# Patient Record
Sex: MALE | Race: WHITE | NOT HISPANIC OR LATINO
[De-identification: names, ages, dates, MRNs, and addresses within clinical notes are randomized per-mention and may not be internally consistent; named-entity substitution may affect disease eponyms.]

---

## 2017-08-14 ENCOUNTER — CHARTING TRANS (OUTPATIENT)
Dept: OTHER | Age: 32
End: 2017-08-14

## 2018-11-03 VITALS
HEIGHT: 73 IN | HEART RATE: 68 BPM | TEMPERATURE: 98.2 F | SYSTOLIC BLOOD PRESSURE: 110 MMHG | BODY MASS INDEX: 26.51 KG/M2 | RESPIRATION RATE: 18 BRPM | WEIGHT: 200 LBS | DIASTOLIC BLOOD PRESSURE: 70 MMHG

## 2020-01-28 ENCOUNTER — WALK IN (OUTPATIENT)
Dept: URGENT CARE | Age: 35
End: 2020-01-28

## 2020-01-28 DIAGNOSIS — Z00.00 NORMAL PHYSICAL EXAM: Primary | ICD-10-CM

## 2020-01-28 PROCEDURE — 69209 REMOVE IMPACTED EAR WAX UNI: CPT | Performed by: NURSE PRACTITIONER

## 2020-01-28 PROCEDURE — 99213 OFFICE O/P EST LOW 20 MIN: CPT | Performed by: NURSE PRACTITIONER

## 2020-01-28 ASSESSMENT — ENCOUNTER SYMPTOMS
EYES NEGATIVE: 1
RESPIRATORY NEGATIVE: 1
CONSTITUTIONAL NEGATIVE: 1

## 2020-01-29 ENCOUNTER — TELEPHONE (OUTPATIENT)
Dept: SCHEDULING | Age: 35
End: 2020-01-29

## 2021-05-17 ENCOUNTER — TELEPHONE (OUTPATIENT)
Dept: FAMILY MEDICINE CLINIC | Facility: CLINIC | Age: 36
End: 2021-05-17

## 2021-05-18 ENCOUNTER — TELEPHONE (OUTPATIENT)
Dept: FAMILY MEDICINE CLINIC | Facility: CLINIC | Age: 36
End: 2021-05-18

## 2021-05-18 NOTE — TELEPHONE ENCOUNTER
LOV 3/15/14  Pt c/o RUQ abdominal discomfort x 2 weeks. Pt thinks it may be anxiety related but would like it check out. No diarrhea, no vomiting, no fevers, no cold symptoms  Offered an appt for tomorrow- Pt request Thurs.   appt given with Nick Jacobo

## 2021-05-18 NOTE — TELEPHONE ENCOUNTER
Pt sent in Friddie Katie message stating he is having severe abdominal pain. Called pt LM to assist pt with scheduling.

## 2021-06-10 ENCOUNTER — OFFICE VISIT (OUTPATIENT)
Dept: FAMILY MEDICINE CLINIC | Facility: CLINIC | Age: 36
End: 2021-06-10
Payer: COMMERCIAL

## 2021-06-10 VITALS
SYSTOLIC BLOOD PRESSURE: 108 MMHG | BODY MASS INDEX: 26.01 KG/M2 | WEIGHT: 198.38 LBS | HEART RATE: 84 BPM | RESPIRATION RATE: 16 BRPM | HEIGHT: 73.23 IN | TEMPERATURE: 98 F | DIASTOLIC BLOOD PRESSURE: 60 MMHG

## 2021-06-10 DIAGNOSIS — F41.0 PANIC ATTACK: ICD-10-CM

## 2021-06-10 DIAGNOSIS — Z00.00 ANNUAL PHYSICAL EXAM: Primary | ICD-10-CM

## 2021-06-10 PROCEDURE — 90471 IMMUNIZATION ADMIN: CPT | Performed by: FAMILY MEDICINE

## 2021-06-10 PROCEDURE — 3008F BODY MASS INDEX DOCD: CPT | Performed by: FAMILY MEDICINE

## 2021-06-10 PROCEDURE — 90715 TDAP VACCINE 7 YRS/> IM: CPT | Performed by: FAMILY MEDICINE

## 2021-06-10 PROCEDURE — 99385 PREV VISIT NEW AGE 18-39: CPT | Performed by: FAMILY MEDICINE

## 2021-06-10 PROCEDURE — 3074F SYST BP LT 130 MM HG: CPT | Performed by: FAMILY MEDICINE

## 2021-06-10 PROCEDURE — 3078F DIAST BP <80 MM HG: CPT | Performed by: FAMILY MEDICINE

## 2021-06-10 RX ORDER — LORAZEPAM 0.5 MG/1
1 TABLET ORAL NIGHTLY
COMMUNITY
Start: 2021-05-09 | End: 2021-07-27

## 2021-06-10 NOTE — PROGRESS NOTES
Fiona Daniel is a 38year old male who presents for a complete physical exam.     had concerns including Physical (Annual), Sleep Problem (9 day stretch , ), and Anxiety (had anxiety attack , went to urgent care and was given alprazolam ).    His last a Endocrine: Negative. Negative for polydipsia, polyphagia and polyuria. Genitourinary: Negative. Musculoskeletal: Negative. Negative for neck pain. Skin: Negative. Neurological: Negative. Psychiatric/Behavioral: Negative.     All other syste Occupational Exposure: No        Hobby Hazards: No        Sleep Concern: No        Stress Concern: Yes        Weight Concern: No        Special Diet: No        Exercise: Yes          walk 90-2 hours daily         Seat Belt: Yes     Exercise: three times pe Cervical back: Normal range of motion and neck supple. Right lower leg: No edema. Left lower leg: No edema. Skin:     General: Skin is warm and dry. Capillary Refill: Capillary refill takes less than 2 seconds.    Neurological:      General

## 2021-06-10 NOTE — PATIENT INSTRUCTIONS
Treating Insomnia     Learning to relax before bedtime can improve your sleep. Good sleeping habits are a key part of treatment.  If needed, some medicines may help you sleep better at first. Making healthy lifestyle changes and learning to relax can im and body tension may keep you awake at night. To unwind before bedtime, try a warm bath, meditation, or yoga. Also try the following:  · Deep breathing. Sit or lie back in a chair. Take a slow, deep breath. Hold it for 5 counts.  Then breathe out slowly thr

## 2021-07-01 LAB
ABSOLUTE BASOPHILS: 38 CELLS/UL (ref 0–200)
ABSOLUTE EOSINOPHILS: 113 CELLS/UL (ref 15–500)
ABSOLUTE LYMPHOCYTES: 1553 CELLS/UL (ref 850–3900)
ABSOLUTE MONOCYTES: 518 CELLS/UL (ref 200–950)
ABSOLUTE NEUTROPHILS: 5280 CELLS/UL (ref 1500–7800)
ALBUMIN/GLOBULIN RATIO: 1.7 (CALC) (ref 1–2.5)
ALBUMIN: 4.6 G/DL (ref 3.6–5.1)
ALKALINE PHOSPHATASE: 61 U/L (ref 36–130)
ALT: 16 U/L (ref 9–46)
AST: 19 U/L (ref 10–40)
BASOPHILS: 0.5 %
BILIRUBIN, TOTAL: 1.2 MG/DL (ref 0.2–1.2)
BUN: 18 MG/DL (ref 7–25)
CALCIUM: 9.8 MG/DL (ref 8.6–10.3)
CARBON DIOXIDE: 26 MMOL/L (ref 20–32)
CHLORIDE: 104 MMOL/L (ref 98–110)
CHOL/HDLC RATIO: 4.2 (CALC)
CHOLESTEROL, TOTAL: 220 MG/DL
CREATININE: 1.08 MG/DL (ref 0.6–1.35)
EGFR IF AFRICN AM: 101 ML/MIN/1.73M2
EGFR IF NONAFRICN AM: 87 ML/MIN/1.73M2
EOSINOPHILS: 1.5 %
GLOBULIN: 2.7 G/DL (CALC) (ref 1.9–3.7)
GLUCOSE: 91 MG/DL (ref 65–99)
HDL CHOLESTEROL: 52 MG/DL
HEMATOCRIT: 42.3 % (ref 38.5–50)
HEMOGLOBIN: 14.2 G/DL (ref 13.2–17.1)
LDL-CHOLESTEROL: 129 MG/DL (CALC)
LYMPHOCYTES: 20.7 %
MCH: 31.1 PG (ref 27–33)
MCHC: 33.6 G/DL (ref 32–36)
MCV: 92.8 FL (ref 80–100)
MONOCYTES: 6.9 %
MPV: 11.5 FL (ref 7.5–12.5)
NEUTROPHILS: 70.4 %
NON-HDL CHOLESTEROL: 168 MG/DL (CALC)
PLATELET COUNT: 223 THOUSAND/UL (ref 140–400)
POTASSIUM: 4.2 MMOL/L (ref 3.5–5.3)
PROTEIN, TOTAL: 7.3 G/DL (ref 6.1–8.1)
RDW: 12.1 % (ref 11–15)
RED BLOOD CELL COUNT: 4.56 MILLION/UL (ref 4.2–5.8)
SODIUM: 140 MMOL/L (ref 135–146)
TRIGLYCERIDES: 255 MG/DL
TSH W/REFLEX TO FT4: 2.23 MIU/L (ref 0.4–4.5)
WHITE BLOOD CELL COUNT: 7.5 THOUSAND/UL (ref 3.8–10.8)

## 2021-07-27 RX ORDER — LORAZEPAM 0.5 MG/1
0.5 TABLET ORAL NIGHTLY
Qty: 30 TABLET | Refills: 2 | Status: SHIPPED | OUTPATIENT
Start: 2021-07-27

## 2021-07-27 NOTE — TELEPHONE ENCOUNTER
Pt calling to request refill on Lorazepam. States he takes it roughly once week when he is not able to sleep. [facility]

## 2021-07-27 NOTE — TELEPHONE ENCOUNTER
LOV 6/10/2021    Requesting refill of Lorazepam. It does not appear we have ever filled this for pt before.     Routed to Dr. Vernon Crowe

## 2021-07-27 NOTE — TELEPHONE ENCOUNTER
Please notify:    Requested Prescriptions     Signed Prescriptions Disp Refills   • LORazepam 0.5 MG Oral Tab 30 tablet 2     Sig: Take 1 tablet (0.5 mg total) by mouth nightly.  As needed     Authorizing Provider: Ranjana Maldonado to:      [facility]

## 2021-09-27 ENCOUNTER — HOSPITAL ENCOUNTER (OUTPATIENT)
Age: 36
Discharge: HOME | End: 2021-09-27
Payer: COMMERCIAL

## 2021-09-27 VITALS
HEART RATE: 102 BPM | RESPIRATION RATE: 18 BRPM | WEIGHT: 206 LBS | OXYGEN SATURATION: 100 % | HEIGHT: 73 IN | BODY MASS INDEX: 27.3 KG/M2 | SYSTOLIC BLOOD PRESSURE: 121 MMHG | TEMPERATURE: 98 F | DIASTOLIC BLOOD PRESSURE: 87 MMHG

## 2021-09-27 DIAGNOSIS — G44.209 TENSION HEADACHE: Primary | ICD-10-CM

## 2021-09-27 PROCEDURE — 99203 OFFICE O/P NEW LOW 30 MIN: CPT | Performed by: NURSE PRACTITIONER

## 2021-09-27 RX ORDER — CYCLOBENZAPRINE HCL 10 MG
10 TABLET ORAL 3 TIMES DAILY PRN
Qty: 15 TABLET | Refills: 0 | Status: SHIPPED | OUTPATIENT
Start: 2021-09-27 | End: 2021-10-04

## 2021-09-27 RX ORDER — BUTALBITAL, ACETAMINOPHEN AND CAFFEINE 50; 325; 40 MG/1; MG/1; MG/1
1-2 TABLET ORAL EVERY 6 HOURS PRN
Qty: 10 TABLET | Refills: 0 | Status: SHIPPED | OUTPATIENT
Start: 2021-09-27 | End: 2021-10-04

## 2021-09-27 NOTE — ED INITIAL ASSESSMENT (HPI)
Per pt having intermittent dull tension headache for a week, [facility] and no changes in symptoms.

## 2021-09-27 NOTE — ED PROVIDER NOTES
Patient Seen in: [facility]      History   Patient presents with:  Headache: Tension headache for over a week - Entered by patient    Stated Complaint: Headache - Tension headache for over a week    Subjective:   Patient presents to the imme pallor, rash and wound. Neurological: Positive for headaches. Negative for dizziness, syncope, facial asymmetry, speech difficulty, weakness, light-headedness and numbness.        Positive for stated complaint: Headache - Tension headache for over a week Cervical back: Normal range of motion and neck supple. Skin:     General: Skin is warm and dry. Capillary Refill: Capillary refill takes less than 2 seconds. Findings: No rash. Neurological:      General: No focal deficit present.       Menta take ibuprofen. Strict ED return precautions given. Discussed plan of care and agrees. Patient directed to follow-up with primary care physician, call for an appointment. Patient understood instructions. Patient feels safe to go home.

## 2021-09-29 ENCOUNTER — OFFICE VISIT (OUTPATIENT)
Dept: FAMILY MEDICINE CLINIC | Facility: CLINIC | Age: 36
End: 2021-09-29
Payer: COMMERCIAL

## 2021-09-29 VITALS
HEIGHT: 73 IN | DIASTOLIC BLOOD PRESSURE: 80 MMHG | WEIGHT: 202.19 LBS | SYSTOLIC BLOOD PRESSURE: 106 MMHG | RESPIRATION RATE: 16 BRPM | BODY MASS INDEX: 26.8 KG/M2 | HEART RATE: 76 BPM

## 2021-09-29 DIAGNOSIS — G44.209 MUSCLE TENSION HEADACHE: Primary | ICD-10-CM

## 2021-09-29 PROCEDURE — 99213 OFFICE O/P EST LOW 20 MIN: CPT | Performed by: FAMILY MEDICINE

## 2021-09-29 PROCEDURE — 3008F BODY MASS INDEX DOCD: CPT | Performed by: FAMILY MEDICINE

## 2021-09-29 PROCEDURE — 3074F SYST BP LT 130 MM HG: CPT | Performed by: FAMILY MEDICINE

## 2021-09-29 PROCEDURE — 3079F DIAST BP 80-89 MM HG: CPT | Performed by: FAMILY MEDICINE

## 2021-09-29 RX ORDER — MELOXICAM 15 MG/1
15 TABLET ORAL DAILY
Qty: 30 TABLET | Refills: 2 | Status: SHIPPED | OUTPATIENT
Start: 2021-09-29 | End: 2021-12-28

## 2021-09-29 NOTE — PATIENT INSTRUCTIONS
meloxicam in place of ibuprofen, daily for 7-14 days,   Cyclobenzaprine muscle relaxant up to 3x daily as needed, cautiously with ativan because of sedation      Treating Insomnia     Learning to relax before bedtime can improve your sleep.    Good sleeping noises, bright lights, TVs, cell phones, and computers from your sleeping environment. · Use a comfortable mattress and pillow. Learn to relax  Stress, anxiety, and body tension may keep you awake at night.  To unwind before bedtime, try a warm bath, medi

## 2021-09-29 NOTE — PROGRESS NOTES
Leobardo Rios is a 43year old male coming in for had concerns including Headache (started the within the last days now, was prescribed medication hasn't taken them yet wanted to talk about it first before taking it ).     Subjective:   Headache   This is a new p normal. No respiratory distress. Breath sounds: No stridor. Musculoskeletal:         General: Normal range of motion. Cervical back: Normal range of motion and neck supple. Skin:     Findings: No rash.    Neurological:      Mental Status: He i

## 2021-12-13 ENCOUNTER — OFFICE VISIT (OUTPATIENT)
Dept: FAMILY MEDICINE CLINIC | Facility: CLINIC | Age: 36
End: 2021-12-13
Payer: COMMERCIAL

## 2021-12-13 VITALS
BODY MASS INDEX: 27.75 KG/M2 | SYSTOLIC BLOOD PRESSURE: 110 MMHG | WEIGHT: 209.38 LBS | HEIGHT: 73 IN | RESPIRATION RATE: 18 BRPM | HEART RATE: 78 BPM | DIASTOLIC BLOOD PRESSURE: 70 MMHG

## 2021-12-13 DIAGNOSIS — R53.83 FATIGUE, UNSPECIFIED TYPE: Primary | ICD-10-CM

## 2021-12-13 PROCEDURE — 3008F BODY MASS INDEX DOCD: CPT | Performed by: FAMILY MEDICINE

## 2021-12-13 PROCEDURE — 3078F DIAST BP <80 MM HG: CPT | Performed by: FAMILY MEDICINE

## 2021-12-13 PROCEDURE — 3074F SYST BP LT 130 MM HG: CPT | Performed by: FAMILY MEDICINE

## 2021-12-13 PROCEDURE — 99213 OFFICE O/P EST LOW 20 MIN: CPT | Performed by: FAMILY MEDICINE

## 2021-12-13 NOTE — PROGRESS NOTES
Ed Greene is a 43year old male coming in for had concerns including Fatigue (started 10-11 days go, crashing around the day / mid-day, feeling tired and then started to get headaches, feeling light headed ).     Subjective:   HPI 2 weeks of issues with fatig Mental Status: He is alert and oriented to person, place, and time. Psychiatric:         Mood and Affect: Mood normal.         Behavior: Behavior normal.         Thought Content:  Thought content normal.         Judgment: Judgment normal.           Asses

## 2022-02-10 RX ORDER — LORAZEPAM 0.5 MG/1
0.5 TABLET ORAL NIGHTLY
Qty: 30 TABLET | Refills: 2 | Status: SHIPPED | OUTPATIENT
Start: 2022-02-10

## 2022-03-23 RX ORDER — MELOXICAM 15 MG/1
TABLET ORAL
Qty: 30 TABLET | Refills: 2 | Status: SHIPPED | OUTPATIENT
Start: 2022-03-23

## 2022-05-20 ENCOUNTER — TELEPHONE (OUTPATIENT)
Dept: FAMILY MEDICINE CLINIC | Facility: CLINIC | Age: 37
End: 2022-05-20

## 2022-05-20 DIAGNOSIS — Z00.00 LABORATORY EXAMINATION ORDERED AS PART OF A ROUTINE GENERAL MEDICAL EXAMINATION: ICD-10-CM

## 2022-05-20 DIAGNOSIS — E55.9 VITAMIN D DEFICIENCY: Primary | ICD-10-CM

## 2022-05-20 NOTE — TELEPHONE ENCOUNTER
Please enter lab orders for the patient's upcoming physical appointment. Physical scheduled: Your appointments     Date & Time Appointment Department [facility])    Jun 17, 2022 11:30 AM CDT Adult Physical with Wilfred Simpson MD [address] [facility]  ([address])    PLEASE NOTE - Most insurances allow a Complete Physical once every 366 days. Please schedule accordingly. Please arrive 15 minutes prior to your scheduled appointment. Please also bring your Insurance card, Photo ID, and your medication bottles or a list of your current medication. If you no longer require this appointment, please contact your physician office to cancel. [address] [place] [address] [facility] [phone]         Preferred lab: [facility]     The patient has been notified to complete fasting labs prior to their physical appointment.

## 2022-05-22 NOTE — TELEPHONE ENCOUNTER
1. Vitamin D deficiency (Primary)  -     Vitamin D, 25-Hydroxy  2. Laboratory examination ordered as part of a routine general medical examination  -     Lipid Panel  -     Comp Metabolic Panel (14)  -     CBC, Platelet; No Differential  -     TSH W Reflex To Free T4  -     Vitamin D, 25-Hydroxy       OK to notify.  Thanks, Dominik Youssef MD

## 2022-05-23 PROBLEM — F41.0 ANXIETY ATTACK: Status: ACTIVE | Noted: 2022-05-23

## 2022-06-17 ENCOUNTER — OFFICE VISIT (OUTPATIENT)
Dept: FAMILY MEDICINE CLINIC | Facility: CLINIC | Age: 37
End: 2022-06-17
Payer: COMMERCIAL

## 2022-06-17 VITALS
BODY MASS INDEX: 28.01 KG/M2 | SYSTOLIC BLOOD PRESSURE: 108 MMHG | RESPIRATION RATE: 18 BRPM | DIASTOLIC BLOOD PRESSURE: 70 MMHG | WEIGHT: 211.38 LBS | HEART RATE: 78 BPM | HEIGHT: 73 IN

## 2022-06-17 DIAGNOSIS — Z00.00 ANNUAL PHYSICAL EXAM: Primary | ICD-10-CM

## 2022-06-17 DIAGNOSIS — F51.01 PRIMARY INSOMNIA: ICD-10-CM

## 2022-06-17 PROCEDURE — 3074F SYST BP LT 130 MM HG: CPT | Performed by: FAMILY MEDICINE

## 2022-06-17 PROCEDURE — 3008F BODY MASS INDEX DOCD: CPT | Performed by: FAMILY MEDICINE

## 2022-06-17 PROCEDURE — 99395 PREV VISIT EST AGE 18-39: CPT | Performed by: FAMILY MEDICINE

## 2022-06-17 PROCEDURE — 3078F DIAST BP <80 MM HG: CPT | Performed by: FAMILY MEDICINE

## 2022-06-17 RX ORDER — TRAZODONE HYDROCHLORIDE 50 MG/1
50 TABLET ORAL NIGHTLY PRN
Qty: 90 TABLET | Refills: 1 | Status: SHIPPED | OUTPATIENT
Start: 2022-06-17 | End: 2022-12-14

## 2022-06-18 NOTE — ASSESSMENT & PLAN NOTE
Trazodone 50 prn. Risks and benefits of medication discussed and alternatives discussed. Patient wishes to proceed with treatment.

## 2022-07-28 ENCOUNTER — TELEPHONE (OUTPATIENT)
Dept: FAMILY MEDICINE CLINIC | Facility: CLINIC | Age: 37
End: 2022-07-28

## 2022-07-28 NOTE — TELEPHONE ENCOUNTER
Pt scheduled an appt in My Chart for chronic fatigue and lightheadedness. He is scheduled on 8/8/22 with Dr. Precious Dial is it ok to wait that long?

## 2022-08-05 LAB
ALBUMIN/GLOBULIN RATIO: 1.8 (CALC) (ref 1–2.5)
ALBUMIN: 4.6 G/DL (ref 3.6–5.1)
ALKALINE PHOSPHATASE: 57 U/L (ref 36–130)
ALT: 24 U/L (ref 9–46)
AST: 18 U/L (ref 10–40)
BILIRUBIN, TOTAL: 1 MG/DL (ref 0.2–1.2)
BUN: 13 MG/DL (ref 7–25)
CALCIUM: 9.4 MG/DL (ref 8.6–10.3)
CARBON DIOXIDE: 29 MMOL/L (ref 20–32)
CHLORIDE: 104 MMOL/L (ref 98–110)
CHOL/HDLC RATIO: 4.6 (CALC)
CHOLESTEROL, TOTAL: 207 MG/DL
CREATININE: 1 MG/DL (ref 0.6–1.26)
EGFR: 99 ML/MIN/1.73M2
GLOBULIN: 2.5 G/DL (CALC) (ref 1.9–3.7)
GLUCOSE: 94 MG/DL (ref 65–99)
HDL CHOLESTEROL: 45 MG/DL
HEMATOCRIT: 40.4 % (ref 38.5–50)
HEMOGLOBIN: 13.8 G/DL (ref 13.2–17.1)
LDL-CHOLESTEROL: 118 MG/DL (CALC)
MCH: 30.9 PG (ref 27–33)
MCHC: 34.2 G/DL (ref 32–36)
MCV: 90.6 FL (ref 80–100)
MPV: 11 FL (ref 7.5–12.5)
NON-HDL CHOLESTEROL: 162 MG/DL (CALC)
PLATELET COUNT: 220 THOUSAND/UL (ref 140–400)
POTASSIUM: 4.4 MMOL/L (ref 3.5–5.3)
PROTEIN, TOTAL: 7.1 G/DL (ref 6.1–8.1)
RDW: 12 % (ref 11–15)
RED BLOOD CELL COUNT: 4.46 MILLION/UL (ref 4.2–5.8)
SODIUM: 138 MMOL/L (ref 135–146)
TRIGLYCERIDES: 311 MG/DL
TSH W/REFLEX TO FT4: 2.01 MIU/L (ref 0.4–4.5)
VITAMIN D, 25-OH, TOTAL: 35 NG/ML (ref 30–100)
WHITE BLOOD CELL COUNT: 6.6 THOUSAND/UL (ref 3.8–10.8)

## 2022-08-08 ENCOUNTER — OFFICE VISIT (OUTPATIENT)
Dept: FAMILY MEDICINE CLINIC | Facility: CLINIC | Age: 37
End: 2022-08-08
Payer: COMMERCIAL

## 2022-08-08 VITALS
HEIGHT: 73.5 IN | RESPIRATION RATE: 18 BRPM | SYSTOLIC BLOOD PRESSURE: 118 MMHG | DIASTOLIC BLOOD PRESSURE: 72 MMHG | HEART RATE: 72 BPM | WEIGHT: 215.5 LBS | BODY MASS INDEX: 27.95 KG/M2

## 2022-08-08 DIAGNOSIS — F41.0 ANXIETY ATTACK: ICD-10-CM

## 2022-08-08 DIAGNOSIS — E55.9 VITAMIN D DEFICIENCY: ICD-10-CM

## 2022-08-08 DIAGNOSIS — R53.83 OTHER FATIGUE: Primary | ICD-10-CM

## 2022-08-08 DIAGNOSIS — F51.01 PRIMARY INSOMNIA: ICD-10-CM

## 2022-08-08 PROCEDURE — 3074F SYST BP LT 130 MM HG: CPT | Performed by: FAMILY MEDICINE

## 2022-08-08 PROCEDURE — 99213 OFFICE O/P EST LOW 20 MIN: CPT | Performed by: FAMILY MEDICINE

## 2022-08-08 PROCEDURE — 3078F DIAST BP <80 MM HG: CPT | Performed by: FAMILY MEDICINE

## 2022-08-08 PROCEDURE — 3008F BODY MASS INDEX DOCD: CPT | Performed by: FAMILY MEDICINE

## 2022-08-08 RX ORDER — LORAZEPAM 0.5 MG/1
0.5 TABLET ORAL NIGHTLY
Qty: 30 TABLET | Refills: 3 | Status: SHIPPED | OUTPATIENT
Start: 2022-08-08

## 2022-08-15 ENCOUNTER — PATIENT MESSAGE (OUTPATIENT)
Dept: FAMILY MEDICINE CLINIC | Facility: CLINIC | Age: 37
End: 2022-08-15

## 2022-08-15 NOTE — TELEPHONE ENCOUNTER
From: Kameron Giordano  To: Darcy Machado MD  Sent: 8/15/2022 8:42 AM CDT  Subject: Sleep Study    Dr. Mau Harrison you had a nice weekend. Just checking back to see if you've heard anything back regarding a potential sleep study. Please let me know when you have a chance.     Thanks for your help,  Khloe Zaman

## 2022-08-29 ENCOUNTER — PATIENT MESSAGE (OUTPATIENT)
Dept: FAMILY MEDICINE CLINIC | Facility: CLINIC | Age: 37
End: 2022-08-29

## 2022-08-30 NOTE — TELEPHONE ENCOUNTER
From: Ervin Porras  To: Marylen Channel, MD  Sent: 8/29/2022 5:03 PM CDT  Subject: Sleep Study    Dr. Valentino Mayotte,    My insurance rejected the in person sleep study. They approved the at home sleep study kit. I have two questions:    1) What would it cost out of pocket to do the in person sleep study? 2) How do I go about scheduling a time to  the equipment for the at home sleep study?     Thanks for your help,  Janell Silver

## 2022-09-09 ENCOUNTER — OFFICE VISIT (OUTPATIENT)
Dept: SLEEP CENTER | Age: 37
End: 2022-09-09
Attending: INTERNAL MEDICINE
Payer: COMMERCIAL

## 2022-09-09 PROCEDURE — 95806 SLEEP STUDY UNATT&RESP EFFT: CPT

## 2022-11-07 ENCOUNTER — OFFICE VISIT (OUTPATIENT)
Dept: FAMILY MEDICINE CLINIC | Facility: CLINIC | Age: 37
End: 2022-11-07
Payer: COMMERCIAL

## 2022-11-07 VITALS
WEIGHT: 219 LBS | DIASTOLIC BLOOD PRESSURE: 78 MMHG | HEART RATE: 78 BPM | SYSTOLIC BLOOD PRESSURE: 136 MMHG | HEIGHT: 73.5 IN | BODY MASS INDEX: 28.41 KG/M2 | RESPIRATION RATE: 16 BRPM

## 2022-11-07 DIAGNOSIS — R53.83 OTHER FATIGUE: Primary | ICD-10-CM

## 2022-11-07 DIAGNOSIS — G44.229 CHRONIC TENSION-TYPE HEADACHE, NOT INTRACTABLE: ICD-10-CM

## 2022-11-07 DIAGNOSIS — F51.01 PRIMARY INSOMNIA: ICD-10-CM

## 2022-11-07 PROCEDURE — 99214 OFFICE O/P EST MOD 30 MIN: CPT | Performed by: FAMILY MEDICINE

## 2022-11-07 PROCEDURE — 3078F DIAST BP <80 MM HG: CPT | Performed by: FAMILY MEDICINE

## 2022-11-07 PROCEDURE — 3008F BODY MASS INDEX DOCD: CPT | Performed by: FAMILY MEDICINE

## 2022-11-07 PROCEDURE — 3075F SYST BP GE 130 - 139MM HG: CPT | Performed by: FAMILY MEDICINE

## 2022-11-15 ENCOUNTER — HOSPITAL ENCOUNTER (OUTPATIENT)
Dept: CV DIAGNOSTICS | Facility: HOSPITAL | Age: 37
Discharge: HOME | End: 2022-11-15
Attending: FAMILY MEDICINE
Payer: COMMERCIAL

## 2022-11-15 DIAGNOSIS — R53.83 OTHER FATIGUE: ICD-10-CM

## 2022-11-15 PROCEDURE — 93017 CV STRESS TEST TRACING ONLY: CPT | Performed by: FAMILY MEDICINE

## 2022-11-15 PROCEDURE — 93018 CV STRESS TEST I&R ONLY: CPT | Performed by: FAMILY MEDICINE

## 2023-02-25 ENCOUNTER — TELEPHONE (OUTPATIENT)
Dept: URGENT CARE | Age: 38
End: 2023-02-25

## 2023-02-27 ENCOUNTER — OFFICE VISIT (OUTPATIENT)
Dept: FAMILY MEDICINE CLINIC | Facility: CLINIC | Age: 38
End: 2023-02-27
Payer: COMMERCIAL

## 2023-02-27 VITALS
HEIGHT: 73.5 IN | WEIGHT: 227.81 LBS | HEART RATE: 80 BPM | SYSTOLIC BLOOD PRESSURE: 120 MMHG | RESPIRATION RATE: 16 BRPM | DIASTOLIC BLOOD PRESSURE: 80 MMHG | BODY MASS INDEX: 29.55 KG/M2

## 2023-02-27 DIAGNOSIS — Z00.00 LABORATORY EXAMINATION ORDERED AS PART OF A ROUTINE GENERAL MEDICAL EXAMINATION: ICD-10-CM

## 2023-02-27 DIAGNOSIS — R10.11 RUQ ABDOMINAL PAIN: Primary | ICD-10-CM

## 2023-02-27 DIAGNOSIS — F41.0 ANXIETY ATTACK: ICD-10-CM

## 2023-02-27 RX ORDER — LORAZEPAM 0.5 MG/1
0.5 TABLET ORAL NIGHTLY PRN
Qty: 30 TABLET | Refills: 1 | Status: SHIPPED | OUTPATIENT
Start: 2023-02-27

## 2023-05-19 ENCOUNTER — TELEPHONE (OUTPATIENT)
Dept: FAMILY MEDICINE CLINIC | Facility: CLINIC | Age: 38
End: 2023-05-19

## 2023-05-19 NOTE — TELEPHONE ENCOUNTER
Please enter lab orders for the patient's upcoming physical appointment. Physical scheduled: Your appointments     Date & Time Appointment Department [facility])    Jun 21, 2023  1:30 PM CDT Adult Physical with Sterling Hernandez MD [address] [place] [address] ([address])    PLEASE NOTE - Most insurances allow a Complete Physical once every 366 days. Please schedule accordingly. Please arrive 15 minutes prior to your scheduled appointment. Please also bring your Insurance card, Photo ID, and your medication bottles or a list of your current medication. If you no longer require this appointment, please contact your physician office to cancel. Lionel Wen [address] [facility] [phone]         Preferred lab: [facility]     The patient has been notified to complete fasting labs prior to their physical appointment.

## 2023-06-16 LAB
ALBUMIN/GLOBULIN RATIO: 1.7 (CALC) (ref 1–2.5)
ALBUMIN: 4.6 G/DL (ref 3.6–5.1)
ALKALINE PHOSPHATASE: 58 U/L (ref 36–130)
ALT: 22 U/L (ref 9–46)
AST: 18 U/L (ref 10–40)
BILIRUBIN, TOTAL: 0.9 MG/DL (ref 0.2–1.2)
BUN: 13 MG/DL (ref 7–25)
CALCIUM: 9.3 MG/DL (ref 8.6–10.3)
CARBON DIOXIDE: 25 MMOL/L (ref 20–32)
CHLORIDE: 101 MMOL/L (ref 98–110)
CHOL/HDLC RATIO: 4.6 (CALC)
CHOLESTEROL, TOTAL: 221 MG/DL
CREATININE: 0.96 MG/DL (ref 0.6–1.26)
EGFR: 103 ML/MIN/1.73M2
GLOBULIN: 2.7 G/DL (CALC) (ref 1.9–3.7)
GLUCOSE: 89 MG/DL (ref 65–99)
HDL CHOLESTEROL: 48 MG/DL
HEMATOCRIT: 42.1 % (ref 38.5–50)
HEMOGLOBIN: 14.4 G/DL (ref 13.2–17.1)
MCH: 31.1 PG (ref 27–33)
MCHC: 34.2 G/DL (ref 32–36)
MCV: 90.9 FL (ref 80–100)
MPV: 11.3 FL (ref 7.5–12.5)
NON-HDL CHOLESTEROL: 173 MG/DL (CALC)
PLATELET COUNT: 232 THOUSAND/UL (ref 140–400)
POTASSIUM: 4.3 MMOL/L (ref 3.5–5.3)
PROTEIN, TOTAL: 7.3 G/DL (ref 6.1–8.1)
RDW: 12.1 % (ref 11–15)
RED BLOOD CELL COUNT: 4.63 MILLION/UL (ref 4.2–5.8)
SODIUM: 137 MMOL/L (ref 135–146)
TRIGLYCERIDES: 433 MG/DL
TSH W/REFLEX TO FT4: 3.18 MIU/L (ref 0.4–4.5)
WHITE BLOOD CELL COUNT: 6.6 THOUSAND/UL (ref 3.8–10.8)

## 2023-06-21 ENCOUNTER — OFFICE VISIT (OUTPATIENT)
Dept: FAMILY MEDICINE CLINIC | Facility: CLINIC | Age: 38
End: 2023-06-21
Payer: COMMERCIAL

## 2023-06-21 VITALS
DIASTOLIC BLOOD PRESSURE: 80 MMHG | BODY MASS INDEX: 28.88 KG/M2 | WEIGHT: 225 LBS | HEART RATE: 100 BPM | SYSTOLIC BLOOD PRESSURE: 112 MMHG | HEIGHT: 74 IN | RESPIRATION RATE: 12 BRPM

## 2023-06-21 DIAGNOSIS — Z00.00 ANNUAL PHYSICAL EXAM: Primary | ICD-10-CM

## 2023-06-21 DIAGNOSIS — F51.01 PRIMARY INSOMNIA: ICD-10-CM

## 2023-06-21 DIAGNOSIS — F41.0 ANXIETY ATTACK: ICD-10-CM

## 2023-06-21 PROBLEM — E78.1 HYPERTRIGLYCERIDEMIA: Status: ACTIVE | Noted: 2023-06-21

## 2023-06-21 PROCEDURE — 3079F DIAST BP 80-89 MM HG: CPT | Performed by: FAMILY MEDICINE

## 2023-06-21 PROCEDURE — 99395 PREV VISIT EST AGE 18-39: CPT | Performed by: FAMILY MEDICINE

## 2023-06-21 PROCEDURE — 3008F BODY MASS INDEX DOCD: CPT | Performed by: FAMILY MEDICINE

## 2023-06-21 PROCEDURE — 3074F SYST BP LT 130 MM HG: CPT | Performed by: FAMILY MEDICINE

## 2023-06-21 NOTE — ASSESSMENT & PLAN NOTE
Clinical Course: stable   Good control  Counseling Recommendations: Continue with counseling.   Anxiolytic Meds: LORazepam Tabs - 0.5 MG

## 2023-07-12 DIAGNOSIS — G44.209 MUSCLE TENSION HEADACHE: ICD-10-CM

## 2023-07-13 RX ORDER — MELOXICAM 15 MG/1
15 TABLET ORAL DAILY
Qty: 30 TABLET | Refills: 1 | Status: SHIPPED | OUTPATIENT
Start: 2023-07-13

## 2023-07-13 NOTE — TELEPHONE ENCOUNTER
Refill request for:    Requested Prescriptions     Pending Prescriptions Disp Refills    Meloxicam 15 MG Oral Tab 30 tablet 2     Sig: Take 1 tablet (15 mg total) by mouth daily. Last Prescribed Quantity Refills   3/23/22 30 2     LOV 6/21/2023     Patient was asked to follow-up in: one year    Appointment due: June 2024    Appointment scheduled: Visit date not found    Medication not on protocol.      # 30 with 2 refills routed to Gera Shelton MD for review

## 2023-11-15 DIAGNOSIS — F41.0 ANXIETY ATTACK: ICD-10-CM

## 2023-11-20 RX ORDER — LORAZEPAM 0.5 MG/1
0.5 TABLET ORAL NIGHTLY PRN
Qty: 30 TABLET | Refills: 1 | Status: SHIPPED | OUTPATIENT
Start: 2023-11-20

## 2023-11-20 NOTE — TELEPHONE ENCOUNTER
Refill request for:    Requested Prescriptions     Pending Prescriptions Disp Refills    LORazepam 0.5 MG Oral Tab 30 tablet 1     Sig: Take 1 tablet (0.5 mg total) by mouth nightly as needed for Anxiety. Last Prescribed Quantity Refills   2/27/23 30 1     LOV 6/21/2023     Patient was asked to follow-up in: one year    Appointment scheduled: Visit date not found    Medication not on protocol.      # 30 with 1 refills routed to Marcia Tony MD for review

## 2024-04-15 ENCOUNTER — TELEPHONE (OUTPATIENT)
Dept: FAMILY MEDICINE CLINIC | Facility: CLINIC | Age: 39
End: 2024-04-15

## 2024-04-15 DIAGNOSIS — Z00.00 LABORATORY EXAMINATION ORDERED AS PART OF A ROUTINE GENERAL MEDICAL EXAMINATION: ICD-10-CM

## 2024-04-15 DIAGNOSIS — Z13.29 SCREENING FOR THYROID DISORDER: ICD-10-CM

## 2024-04-15 DIAGNOSIS — Z11.59 ENCOUNTER FOR HEPATITIS C SCREENING TEST FOR LOW RISK PATIENT: ICD-10-CM

## 2024-04-15 DIAGNOSIS — Z13.0 SCREENING FOR IRON DEFICIENCY ANEMIA: ICD-10-CM

## 2024-04-15 DIAGNOSIS — E78.5 DYSLIPIDEMIA: ICD-10-CM

## 2024-04-15 DIAGNOSIS — Z13.6 SCREENING FOR CARDIOVASCULAR CONDITION: ICD-10-CM

## 2024-04-15 DIAGNOSIS — Z13.1 SCREENING FOR DIABETES MELLITUS: Primary | ICD-10-CM

## 2024-04-15 NOTE — TELEPHONE ENCOUNTER
Please enter lab orders for the patient's upcoming physical appointment.     Physical scheduled:   Your appointments       Date & Time Appointment Department ([facility])    Jun 26, 2024 11:00 AM CDT Adult Physical with Woody Castro MD [facility] ([facility])    PLEASE NOTE - Most insurances allow a Complete Physical once every 366 days. Please schedule accordingly.    Please arrive 15 minutes prior to your scheduled appointment. Please also bring your Insurance card, Photo ID, and your medication bottles or a list of your current medication.    If you no longer require this appointment, please contact your physician office to cancel.              [facility] [address]  [address] [place] [phone]           Preferred lab: [facility]     The patient has been notified to complete fasting labs prior to their physical appointment.

## 2024-04-15 NOTE — TELEPHONE ENCOUNTER
1. Screening for diabetes mellitus (Primary)  -     Comp Metabolic Panel (14)  2. Screening for iron deficiency anemia  -     CBC With Differential With Platelet  3. Screening for thyroid disorder  -     TSH W Reflex To Free T4  4. Screening for cardiovascular condition  -     Lipid Panel  5. Encounter for hepatitis C screening test for low risk patient  -     HCV Antibody  6. Laboratory examination ordered as part of a routine general medical examination  -     TSH W Reflex To Free T4  -     Lipid Panel  -     CBC With Differential With Platelet  -     Comp Metabolic Panel (14)  -     HCV Antibody  7. Dyslipidemia  -     TSH W Reflex To Free T4  -     Lipid Panel       OK to notify. Thanks, Vald Castro MD

## 2024-06-10 DIAGNOSIS — F41.0 ANXIETY ATTACK: ICD-10-CM

## 2024-06-11 RX ORDER — LORAZEPAM 0.5 MG/1
0.5 TABLET ORAL NIGHTLY PRN
Qty: 30 TABLET | Refills: 0 | Status: SHIPPED | OUTPATIENT
Start: 2024-06-11

## 2024-06-11 NOTE — TELEPHONE ENCOUNTER
Requested Prescriptions     Pending Prescriptions Disp Refills    LORazepam 0.5 MG Oral Tab 30 tablet 1     Sig: Take 1 tablet (0.5 mg total) by mouth nightly as needed for Anxiety.     LOV 6/21/2023     Patient was asked to follow-up in: 1 year    Appointment scheduled: 6/26/2024 Woody Castro MD     Medication failed protocol.    Routed to front staff     Patient is due for their annual physical please call patient and have them schedule an appointment

## 2024-06-25 NOTE — ASSESSMENT & PLAN NOTE
8/4/2022: VITAMIN D, 25-OH, TOTAL 35 stable, continue present management and continue to monitor for progression

## 2024-06-25 NOTE — ASSESSMENT & PLAN NOTE
Cholesterol shows Good control. Long term heart-healthy diet and lifestyle discussed and encouraged to reduce risk of cardiovascular disease.  Cholesterol: 221, done on 6/15/2023.  HDL Cholesterol: 48, done on 6/15/2023.  TriGlycerides 433, done on 6/15/2023.  LDL Cholesterol: 118, done on 8/4/2022.   No current Cholesterol medication.

## 2024-06-26 ENCOUNTER — OFFICE VISIT (OUTPATIENT)
Dept: FAMILY MEDICINE CLINIC | Facility: CLINIC | Age: 39
End: 2024-06-26

## 2024-06-26 VITALS
DIASTOLIC BLOOD PRESSURE: 80 MMHG | BODY MASS INDEX: 26.85 KG/M2 | WEIGHT: 209.19 LBS | SYSTOLIC BLOOD PRESSURE: 124 MMHG | RESPIRATION RATE: 16 BRPM | HEIGHT: 74 IN | HEART RATE: 82 BPM

## 2024-06-26 DIAGNOSIS — Z87.891 HISTORY OF TOBACCO USE: ICD-10-CM

## 2024-06-26 DIAGNOSIS — E78.1 HYPERTRIGLYCERIDEMIA: ICD-10-CM

## 2024-06-26 DIAGNOSIS — F51.01 PRIMARY INSOMNIA: ICD-10-CM

## 2024-06-26 DIAGNOSIS — Z00.00 ANNUAL PHYSICAL EXAM: Primary | ICD-10-CM

## 2024-06-26 DIAGNOSIS — F41.0 ANXIETY ATTACK: ICD-10-CM

## 2024-06-26 PROCEDURE — 99396 PREV VISIT EST AGE 40-64: CPT | Performed by: FAMILY MEDICINE

## 2024-06-26 NOTE — PROGRESS NOTES
Archie Fowler is a 38 year old male who presents for a complete physical exam.     had concerns including Physical (Annual/).   His last annual assessment has been over 1 year: Annual Physical due on 06/21/2024       Subjective:    He complains of dogin well, trigs up last year to 400 but cut sugars. . Exercising more    Tobacco:  Social History     Tobacco Use   Smoking Status Every Day    Current packs/day: 0.50    Types: Cigarettes   Smokeless Tobacco Never   Tobacco Comments    10 cig a week     E-Cigarettes/Vaping       Questions Responses    E-Cigarette Use Never User           Tobacco cessation counseling for <3 minutes.       Wt Readings from Last 4 Encounters:   06/26/24 209 lb 3.2 oz (94.9 kg)   06/21/23 225 lb (102.1 kg)   02/27/23 227 lb 12.8 oz (103.3 kg)   11/07/22 219 lb (99.3 kg)     Body mass index is 26.86 kg/m².     The 10-year ASCVD risk score (Alba SCHROEDER, et al., 2019) is: 4.5%    Values used to calculate the score:      Age: 38 years      Sex: Male      Is Non- : No      Diabetic: No      Tobacco smoker: Yes      Systolic Blood Pressure: 124 mmHg      Is BP treated: No      HDL Cholesterol: 48 mg/dL      Total Cholesterol: 221 mg/dL    Chief Complaint Reviewed and Verified  Nursing Notes Reviewed and   Verified  Tobacco Reviewed  Allergies Reviewed  Medications Reviewed    Problem List Reviewed  Medical History Reviewed  Surgical History   Reviewed  Family History Reviewed          His family history is not on file.   He  reports that he has been smoking cigarettes. He has never used smokeless tobacco. He reports current alcohol use of about 4.2 - 8.3 standard drinks of alcohol per week. He reports that he does not use drugs.    Exercise: three times per week, [employer].  Diet: watches sugar closely    There are no preventive care reminders to display for this patient.   No results found for this or any previous visit.     No recommendations at this time    Pneumococcal Vaccination(1 of 2 - PCV) Never done   Health Maintenance Due   Topic Date Due    Pneumococcal Vaccine: Birth to 64yrs (1 of 2 - PCV) Never done    COVID-19 Vaccine (5 - 2023-24 season) 09/01/2023    Annual Depression Screening  01/01/2024    Tobacco Cessation Counseling  01/01/2024    Annual Physical  06/21/2024         Review of Systems   Constitutional: Negative.  Negative for activity change, appetite change, chills and fever.   HENT: Negative.     Eyes: Negative.    Respiratory: Negative.  Negative for shortness of breath.    Cardiovascular: Negative.  Negative for chest pain and palpitations.   Gastrointestinal: Negative.  Negative for abdominal pain.   Genitourinary: Negative.  Negative for dysuria.   Musculoskeletal:  Negative for arthralgias.   Skin: Negative.  Negative for rash.   Allergic/Immunologic: Negative.    Neurological: Negative.         Results:    Lab Results   Component Value Date/Time    WBC 6.6 06/15/2023 10:14 AM    HGB 14.4 06/15/2023 10:14 AM     06/15/2023 10:14 AM      Lab Results   Component Value Date/Time    GLU 89 06/15/2023 10:14 AM     06/15/2023 10:14 AM    K 4.3 06/15/2023 10:14 AM     06/15/2023 10:14 AM    CO2 25 06/15/2023 10:14 AM    CREATSERUM 0.96 06/15/2023 10:14 AM    CA 9.3 06/15/2023 10:14 AM    ALB 4.6 06/15/2023 10:14 AM    TP 7.3 06/15/2023 10:14 AM    ALKPHO 58 06/15/2023 10:14 AM    AST 18 06/15/2023 10:14 AM    ALT 22 06/15/2023 10:14 AM    BILT 0.9 06/15/2023 10:14 AM    TSH 1.30 01/05/2013 10:42 AM    T4F 1.3 01/05/2013 10:42 AM        Lab Results   Component Value Date/Time    CHOLEST 221 (H) 06/15/2023 10:14 AM    HDL 48 06/15/2023 10:14 AM    TRIG 433 (H) 06/15/2023 10:14 AM    LDL  06/15/2023 10:14 AM      Comment:         LDL cholesterol not calculated. Triglyceride levels  greater than 400 mg/dL invalidate calculated LDL results.     Reference range: <100     Desirable range <100 mg/dL for primary prevention;    <70 mg/dL  for patients with CHD or diabetic patients   with > or = 2 CHD risk factors.     LDL-C is now calculated using the Maninder   calculation, which is a validated novel method providing   better accuracy than the Friedewald equation in the   estimation of LDL-C.   Micky BOB et al. KE. 2013;31019): 6948-2131   (http://[employer]/faq/WMY252)      NONHDLC 173 (H) 06/15/2023 10:14 AM       Last A1c value was  % done  .     Vitamin D:     Lab Results   Component Value Date    VITD 35 08/04/2022          Objective:    EXAM:  /80   Pulse 82   Resp 16   Ht 6' 2\" (1.88 m)   Wt 209 lb 3.2 oz (94.9 kg)   BMI 26.86 kg/m²  Estimated body mass index is 26.86 kg/m² as calculated from the following:    Height as of this encounter: 6' 2\" (1.88 m).    Weight as of this encounter: 209 lb 3.2 oz (94.9 kg).   Physical Exam  Vitals and nursing note reviewed.   Constitutional:       General: He is not in acute distress.     Appearance: Normal appearance.   HENT:      Head: Normocephalic and atraumatic.      Right Ear: Tympanic membrane and external ear normal.      Left Ear: Tympanic membrane and external ear normal.      Nose: Nose normal.      Mouth/Throat:      Mouth: Mucous membranes are moist.   Eyes:      Extraocular Movements: Extraocular movements intact.      Pupils: Pupils are equal, round, and reactive to light.   Cardiovascular:      Rate and Rhythm: Normal rate and regular rhythm.      Pulses: Normal pulses.           Carotid pulses are 2+ on the right side and 2+ on the left side.       Radial pulses are 2+ on the right side and 2+ on the left side.        Dorsalis pedis pulses are 2+ on the right side and 2+ on the left side.        Posterior tibial pulses are 2+ on the right side and 2+ on the left side.      Heart sounds: Normal heart sounds, S1 normal and S2 normal. No murmur heard.  Pulmonary:      Effort: Pulmonary effort is normal.      Breath sounds: Normal breath sounds.    Abdominal:      General: Abdomen is flat. Bowel sounds are normal. There is no distension.      Palpations: Abdomen is soft.   Musculoskeletal:         General: Normal range of motion.      Cervical back: Normal range of motion and neck supple.      Right lower leg: No edema.      Left lower leg: No edema.   Skin:     General: Skin is warm and dry.      Capillary Refill: Capillary refill takes less than 2 seconds.   Neurological:      General: No focal deficit present.      Mental Status: He is alert and oriented to person, place, and time.   Psychiatric:         Mood and Affect: Mood normal.         Behavior: Behavior normal.         Thought Content: Thought content normal.          Assessment & Plan:    Archie Fowler is a 38 year old male who presents for a complete physical exam.   Pt's weight is Body mass index is 26.86 kg/m²., recommended low fat diet and aerobic exercise 30 minutes three times weekly.   Health maintenance, Up to date    Immunizations: Up to date   Immunization History   Administered Date(s) Administered    Covid-19 Vaccine Pfizer 30 mcg/0.3 ml 03/15/2021, 04/12/2021, 10/26/2021    Covid-19 Vaccine Pfizer Bivalent 30mcg/0.3mL 10/11/2022    TDAP 06/10/2021         Pt info given for: exercise, low fat diet, The patient indicates understanding of these issues and agrees to the plan.  The patient is asked to return for CPX in 1 years.    Assessment:  1. Annual physical exam (Primary)  2. Hypertriglyceridemia  Overview:  Trigs 433 6/2023  Assessment & Plan:  Cholesterol shows Good control. Long term heart-healthy diet and lifestyle discussed and encouraged to reduce risk of cardiovascular disease.  Cholesterol: 221, done on 6/15/2023.  HDL Cholesterol: 48, done on 6/15/2023.  TriGlycerides 433, done on 6/15/2023.  LDL Cholesterol: 118, done on 8/4/2022.   No current Cholesterol medication.     3. Anxiety attack  Overview:  Working with therapist to see patternds  Assessment & Plan:  Clinical  Course: stable   Good control  Anxiolytic Meds: LORazepam Tabs - 0.5 MG   4. Primary insomnia  Overview:  Trazodone 50 prn and Therapuetic Lifestyle Change   Assessment & Plan:  Stable, continue present management and continue to monitor for progression   5. History of tobacco use  -     Tobacco Cessation Discussion     I am having Archie CHRIS Fowler maintain his Meloxicam and LORazepam.     Return in about 1 year (around 6/26/2025) for Annual physical.

## 2024-06-27 LAB
ABSOLUTE BASOPHILS: 42 CELLS/UL (ref 0–200)
ABSOLUTE EOSINOPHILS: 180 CELLS/UL (ref 15–500)
ABSOLUTE LYMPHOCYTES: 1422 CELLS/UL (ref 850–3900)
ABSOLUTE MONOCYTES: 474 CELLS/UL (ref 200–950)
ABSOLUTE NEUTROPHILS: 3882 CELLS/UL (ref 1500–7800)
ALBUMIN/GLOBULIN RATIO: 1.8 (CALC) (ref 1–2.5)
ALBUMIN: 4.5 G/DL (ref 3.6–5.1)
ALKALINE PHOSPHATASE: 55 U/L (ref 36–130)
ALT: 17 U/L (ref 9–46)
AST: 18 U/L (ref 10–40)
BASOPHILS: 0.7 %
BILIRUBIN, TOTAL: 1 MG/DL (ref 0.2–1.2)
BUN: 14 MG/DL (ref 7–25)
CALCIUM: 9.6 MG/DL (ref 8.6–10.3)
CARBON DIOXIDE: 24 MMOL/L (ref 20–32)
CHLORIDE: 102 MMOL/L (ref 98–110)
CHOL/HDLC RATIO: 4.1 (CALC)
CHOLESTEROL, TOTAL: 203 MG/DL
CREATININE: 1.02 MG/DL (ref 0.6–1.29)
EGFR: 95 ML/MIN/1.73M2
EOSINOPHILS: 3 %
GLOBULIN: 2.5 G/DL (CALC) (ref 1.9–3.7)
GLUCOSE: 93 MG/DL (ref 65–99)
HDL CHOLESTEROL: 50 MG/DL
HEMATOCRIT: 39.9 % (ref 38.5–50)
HEMOGLOBIN: 13.4 G/DL (ref 13.2–17.1)
LDL-CHOLESTEROL: 106 MG/DL (CALC)
LYMPHOCYTES: 23.7 %
MCH: 30.7 PG (ref 27–33)
MCHC: 33.6 G/DL (ref 32–36)
MCV: 91.5 FL (ref 80–100)
MONOCYTES: 7.9 %
MPV: 11.1 FL (ref 7.5–12.5)
NEUTROPHILS: 64.7 %
NON-HDL CHOLESTEROL: 153 MG/DL (CALC)
PLATELET COUNT: 219 THOUSAND/UL (ref 140–400)
POTASSIUM: 4.3 MMOL/L (ref 3.5–5.3)
PROTEIN, TOTAL: 7 G/DL (ref 6.1–8.1)
RDW: 12.2 % (ref 11–15)
RED BLOOD CELL COUNT: 4.36 MILLION/UL (ref 4.2–5.8)
SODIUM: 138 MMOL/L (ref 135–146)
TRIGLYCERIDES: 352 MG/DL
TSH W/REFLEX TO FT4: 2.58 MIU/L (ref 0.4–4.5)
WHITE BLOOD CELL COUNT: 6 THOUSAND/UL (ref 3.8–10.8)

## 2024-08-26 DIAGNOSIS — F41.0 ANXIETY ATTACK: ICD-10-CM

## 2024-08-27 RX ORDER — LORAZEPAM 0.5 MG/1
0.5 TABLET ORAL NIGHTLY PRN
Qty: 30 TABLET | Refills: 1 | Status: SHIPPED | OUTPATIENT
Start: 2024-08-27

## 2024-08-27 NOTE — TELEPHONE ENCOUNTER
Refill request for:    Requested Prescriptions     Pending Prescriptions Disp Refills    LORazepam 0.5 MG Oral Tab 30 tablet 0     Sig: Take 1 tablet (0.5 mg total) by mouth nightly as needed for Anxiety.        Last Prescribed Quantity Refills   6/11/24 30 0     LOV 6/26/2024     Patient was asked to follow-up in: 1 year    Appointment scheduled: Visit date not found    Medication not on protocol.     # 30 with 0 refills routed to Woody Castro MD for review

## 2024-11-02 ENCOUNTER — TELEPHONE (OUTPATIENT)
Dept: URGENT CARE | Age: 39
End: 2024-11-02

## 2024-11-03 ENCOUNTER — APPOINTMENT (OUTPATIENT)
Dept: URGENT CARE | Age: 39
End: 2024-11-03

## 2024-12-14 DIAGNOSIS — F41.0 ANXIETY ATTACK: ICD-10-CM

## 2024-12-16 ENCOUNTER — PATIENT MESSAGE (OUTPATIENT)
Dept: FAMILY MEDICINE CLINIC | Facility: CLINIC | Age: 39
End: 2024-12-16

## 2024-12-16 DIAGNOSIS — F41.0 ANXIETY ATTACK: ICD-10-CM

## 2024-12-16 DIAGNOSIS — G44.209 MUSCLE TENSION HEADACHE: ICD-10-CM

## 2024-12-16 RX ORDER — MELOXICAM 15 MG/1
15 TABLET ORAL DAILY
Qty: 30 TABLET | Refills: 1 | OUTPATIENT
Start: 2024-12-16

## 2024-12-16 RX ORDER — MELOXICAM 15 MG/1
15 TABLET ORAL DAILY
Qty: 30 TABLET | Refills: 0 | Status: SHIPPED | OUTPATIENT
Start: 2024-12-16

## 2024-12-16 RX ORDER — LORAZEPAM 0.5 MG/1
0.5 TABLET ORAL NIGHTLY PRN
Qty: 30 TABLET | Refills: 1 | OUTPATIENT
Start: 2024-12-16

## 2024-12-16 NOTE — TELEPHONE ENCOUNTER
Requested Prescriptions     Pending Prescriptions Disp Refills    LORAZEPAM 0.5 MG Oral Tab [Pharmacy Med Name: LORAZEPAM 0.5MG TABLETS] 30 tablet 0     Sig: TAKE 1 TABLET(0.5 MG) BY MOUTH EVERY NIGHT AS NEEDED FOR ANXIETY        Last refill: 8/27/24 30 tabs 1 refill    Last Appointment: 6/26/24    Next Appointment: No future OV's scheduled

## 2024-12-16 NOTE — TELEPHONE ENCOUNTER
Requested Prescriptions     Refused Prescriptions Disp Refills    Meloxicam 15 MG Oral Tab 30 tablet 1     Sig: Take 1 tablet (15 mg total) by mouth daily.     Refused By: MIKE ARAGON     Reason for Refusal: Duplicate refill request    LORazepam 0.5 MG Oral Tab 30 tablet 1     Sig: Take 1 tablet (0.5 mg total) by mouth nightly as needed for Anxiety.     Refused By: MIKE ARAGON     Reason for Refusal: Duplicate refill request      Refilled earlier today

## 2024-12-16 NOTE — TELEPHONE ENCOUNTER
Requested Prescriptions     Signed Prescriptions Disp Refills    Meloxicam 15 MG Oral Tab 30 tablet 0     Sig: Take 1 tablet (15 mg total) by mouth daily.     Authorizing Provider: SHAMEKA PRATT     Ordering User: MIKE ARAGON      Refilled per protocol/OV notes

## 2024-12-17 RX ORDER — MELOXICAM 15 MG/1
15 TABLET ORAL DAILY
Qty: 30 TABLET | Refills: 0 | OUTPATIENT
Start: 2024-12-17

## 2024-12-17 NOTE — TELEPHONE ENCOUNTER
Requested Prescriptions     Refused Prescriptions Disp Refills    Meloxicam 15 MG Oral Tab 30 tablet 0     Sig: Take 1 tablet (15 mg total) by mouth daily.     Refused By: MIKE ARAGON     Reason for Refusal: Duplicate refill request     Refilled yesterday

## 2024-12-18 ENCOUNTER — TELEPHONE (OUTPATIENT)
Dept: FAMILY MEDICINE CLINIC | Facility: CLINIC | Age: 39
End: 2024-12-18

## 2024-12-18 DIAGNOSIS — F41.0 ANXIETY ATTACK: ICD-10-CM

## 2024-12-18 RX ORDER — LORAZEPAM 0.5 MG/1
0.5 TABLET ORAL NIGHTLY PRN
Qty: 30 TABLET | Refills: 3 | OUTPATIENT
Start: 2024-12-18

## 2024-12-18 RX ORDER — LORAZEPAM 0.5 MG/1
0.5 TABLET ORAL NIGHTLY PRN
Qty: 30 TABLET | Refills: 3 | Status: SHIPPED | OUTPATIENT
Start: 2024-12-18 | End: 2024-12-20

## 2024-12-18 NOTE — TELEPHONE ENCOUNTER
Duplicate rx. Sent today      Outpatient Medication Detail     Disp Refills Start End    LORAZEPAM 0.5 MG Oral Tab 30 tablet 3 12/18/2024 --    Sig - Route: TAKE 1 TABLET(0.5 MG) BY MOUTH EVERY NIGHT AS NEEDED FOR ANXIETY - Oral    Sent to pharmacy as: LORazepam 0.5 MG Oral Tablet (Ativan)    E-Prescribing Status: Receipt confirmed by pharmacy (12/18/2024 11:27 AM CST)

## 2024-12-19 NOTE — TELEPHONE ENCOUNTER
Pt is calling [facility] told him they are unable to refill the script for his Lorazepam with out the doctor calling stating it is ok to fill.  He is currently in [place] and is asking for [facility] to transfer it. Please call pharmacy to see if it can be filled phone number is: [phone] [facility] [place], [place] [address]        Pt is out of the medication.

## 2024-12-20 RX ORDER — LORAZEPAM 0.5 MG/1
0.5 TABLET ORAL NIGHTLY PRN
Qty: 14 TABLET | Refills: 0 | Status: SHIPPED | OUTPATIENT
Start: 2024-12-20

## 2024-12-20 NOTE — TELEPHONE ENCOUNTER
Please notify:    Requested Prescriptions     Signed Prescriptions Disp Refills    LORazepam 0.5 MG Oral Tab 14 tablet 0     Sig: Take 1 tablet (0.5 mg total) by mouth nightly as needed.     Authorizing Provider: SHAMEKA PRATT     Refused Prescriptions Disp Refills    LORazepam 0.5 MG Oral Tab 30 tablet 3     Sig: Take 1 tablet (0.5 mg total) by mouth nightly as needed.     Refused By: LEILANI HENRY     Reason for Refusal: Duplicate refill request      Sent directly to [place], not sure if they will fill. Script for # 14 emergency refill  Sent to:      [facility] #[id number] - [place], [place] - [address] AT [facility], [phone], [phone] [address] [facility] [address]  Phone: [phone] Fax: [phone]    [id number] - [place] - [address] AT [facility] OF [facility] & [address], [phone], [phone]  [address] [address]  Phone: [phone] Fax: [phone]

## 2025-01-12 DIAGNOSIS — F41.0 ANXIETY ATTACK: ICD-10-CM

## 2025-01-14 RX ORDER — LORAZEPAM 0.5 MG/1
0.5 TABLET ORAL NIGHTLY PRN
Qty: 14 TABLET | Refills: 1 | Status: SHIPPED | OUTPATIENT
Start: 2025-01-14

## 2025-03-04 DIAGNOSIS — F41.0 ANXIETY ATTACK: ICD-10-CM

## 2025-03-04 RX ORDER — LORAZEPAM 0.5 MG/1
0.5 TABLET ORAL NIGHTLY PRN
Qty: 14 TABLET | Refills: 1 | Status: CANCELLED | OUTPATIENT
Start: 2025-03-04

## 2025-03-06 NOTE — TELEPHONE ENCOUNTER
Requested Prescriptions     Pending Prescriptions Disp Refills    LORazepam 0.5 MG Oral Tab 14 tablet 1     Sig: Take 1 tablet (0.5 mg total) by mouth nightly as needed.     LOV 6/26/2024     Patient was asked to follow-up in: 1 year    Appointment scheduled: Visit date not found     Medication failed protocol.    Routed to Dr. Matthew MD

## 2025-03-10 RX ORDER — MELOXICAM 15 MG/1
15 TABLET ORAL DAILY
Qty: 30 TABLET | Refills: 1 | Status: SHIPPED | OUTPATIENT
Start: 2025-03-10

## 2025-03-10 NOTE — TELEPHONE ENCOUNTER
Failed protocol    Requested Prescriptions     Pending Prescriptions Disp Refills    MELOXICAM 15 MG Oral Tab [Pharmacy Med Name: MELOXICAM 15MG TABLETS] 30 tablet 0     Sig: TAKE 1 TABLET(15 MG) BY MOUTH DAILY        Last refill: 12/16/24 30 tabs 0 refills    Last Appointment: LOV 6/26/2024     Next Appointment: Visit date not found

## 2025-05-09 ENCOUNTER — TELEPHONE (OUTPATIENT)
Dept: FAMILY MEDICINE CLINIC | Facility: CLINIC | Age: 40
End: 2025-05-09

## 2025-05-09 DIAGNOSIS — Z13.0 SCREENING FOR IRON DEFICIENCY ANEMIA: ICD-10-CM

## 2025-05-09 DIAGNOSIS — Z00.00 LABORATORY EXAMINATION ORDERED AS PART OF A ROUTINE GENERAL MEDICAL EXAMINATION: ICD-10-CM

## 2025-05-09 DIAGNOSIS — Z13.1 SCREENING FOR DIABETES MELLITUS: Primary | ICD-10-CM

## 2025-05-09 DIAGNOSIS — Z13.6 SCREENING FOR CARDIOVASCULAR CONDITION: ICD-10-CM

## 2025-05-09 DIAGNOSIS — Z13.29 SCREENING FOR THYROID DISORDER: ICD-10-CM

## 2025-05-09 DIAGNOSIS — E78.5 DYSLIPIDEMIA: ICD-10-CM

## 2025-05-09 NOTE — TELEPHONE ENCOUNTER
1. Screening for diabetes mellitus (Primary)  -     Comp Metabolic Panel (14)  2. Screening for iron deficiency anemia  -     CBC With Differential With Platelet  3. Screening for thyroid disorder  -     TSH W Reflex To Free T4  4. Screening for cardiovascular condition  -     Lipid Panel  5. Laboratory examination ordered as part of a routine general medical examination  -     TSH W Reflex To Free T4  -     Lipid Panel  -     CBC With Differential With Platelet  -     Comp Metabolic Panel (14)  6. Dyslipidemia  -     TSH W Reflex To Free T4  -     Lipid Panel       OK to notify. Thanks, Vlad Castro MD

## 2025-05-09 NOTE — TELEPHONE ENCOUNTER
Please enter lab orders for the patient's upcoming physical appointment.     Physical scheduled:   Your appointments       Date & Time Appointment Department ([place])    Jun 26, 2025 2:30 PM CDT Adult Physical with Woody Castro MD [facility] ([facility])    PLEASE NOTE - Most insurances allow a Complete Physical once every 366 days. Please schedule accordingly.    Please arrive 15 minutes prior to your scheduled appointment. Please also bring your Insurance card, Photo ID, and your medication bottles or a list of your current medication.    If you no longer require this appointment, please contact your physician office to cancel.              [facility] [address]  [address] [place] [address] [id number] [phone]           Preferred lab: [facility]     The patient has been notified to complete fasting labs prior to their physical appointment.

## 2025-05-09 NOTE — TELEPHONE ENCOUNTER
Please enter lab orders for the patient's upcoming physical appointment.     Physical scheduled:   Your appointments       Date & Time Appointment Department ([place])    Jun 26, 2025 2:30 PM CDT Adult Physical with Woody Castro MD [facility] ([facility])    PLEASE NOTE - Most insurances allow a Complete Physical once every 366 days. Please schedule accordingly.    Please arrive 15 minutes prior to your scheduled appointment. Please also bring your Insurance card, Photo ID, and your medication bottles or a list of your current medication.    If you no longer require this appointment, please contact your physician office to cancel.              [facility]  [address] [place] [address] [id number] [phone]           Preferred lab: [facility]     The patient has been notified to complete fasting labs prior to their physical appointment. ,

## 2025-05-29 ENCOUNTER — OFFICE VISIT (OUTPATIENT)
Dept: FAMILY MEDICINE CLINIC | Facility: CLINIC | Age: 40
End: 2025-05-29
Payer: COMMERCIAL

## 2025-05-29 VITALS
DIASTOLIC BLOOD PRESSURE: 76 MMHG | BODY MASS INDEX: 27.22 KG/M2 | OXYGEN SATURATION: 98 % | RESPIRATION RATE: 14 BRPM | HEIGHT: 73.5 IN | HEART RATE: 66 BPM | SYSTOLIC BLOOD PRESSURE: 124 MMHG | WEIGHT: 209.81 LBS

## 2025-05-29 DIAGNOSIS — R39.9 UTI SYMPTOMS: ICD-10-CM

## 2025-05-29 DIAGNOSIS — Z71.6 ENCOUNTER FOR TOBACCO USE CESSATION COUNSELING: ICD-10-CM

## 2025-05-29 DIAGNOSIS — F41.0 ANXIETY ATTACK: ICD-10-CM

## 2025-05-29 DIAGNOSIS — G47.00 PERSISTENT DISORDER OF INITIATING OR MAINTAINING SLEEP: ICD-10-CM

## 2025-05-29 DIAGNOSIS — R30.0 DYSURIA: Primary | ICD-10-CM

## 2025-05-29 LAB
ABSOLUTE BASOPHILS: 41 CELLS/UL (ref 0–200)
ABSOLUTE EOSINOPHILS: 156 CELLS/UL (ref 15–500)
ABSOLUTE LYMPHOCYTES: 1734 CELLS/UL (ref 850–3900)
ABSOLUTE MONOCYTES: 626 CELLS/UL (ref 200–950)
ABSOLUTE NEUTROPHILS: 4243 CELLS/UL (ref 1500–7800)
ALBUMIN/GLOBULIN RATIO: 1.8 (CALC) (ref 1–2.5)
ALBUMIN: 4.6 G/DL (ref 3.6–5.1)
ALKALINE PHOSPHATASE: 52 U/L (ref 36–130)
ALT: 15 U/L (ref 9–46)
AST: 17 U/L (ref 10–40)
BASOPHILS: 0.6 %
BILIRUBIN, TOTAL: 1.1 MG/DL (ref 0.2–1.2)
BUN: 13 MG/DL (ref 7–25)
CALCIUM: 9.5 MG/DL (ref 8.6–10.3)
CARBON DIOXIDE: 29 MMOL/L (ref 20–32)
CHLORIDE: 103 MMOL/L (ref 98–110)
CHOL/HDLC RATIO: 4.8 (CALC)
CHOLESTEROL, TOTAL: 216 MG/DL
CREATININE: 1.01 MG/DL (ref 0.6–1.29)
EGFR: 96 ML/MIN/1.73M2
EOSINOPHILS: 2.3 %
GLOBULIN: 2.6 G/DL (CALC) (ref 1.9–3.7)
GLUCOSE: 86 MG/DL (ref 65–99)
HDL CHOLESTEROL: 45 MG/DL
HEMATOCRIT: 43.3 % (ref 38.5–50)
HEMOGLOBIN: 14.6 G/DL (ref 13.2–17.1)
LDL-CHOLESTEROL: 134 MG/DL (CALC)
LYMPHOCYTES: 25.5 %
MCH: 31 PG (ref 27–33)
MCHC: 33.7 G/DL (ref 32–36)
MCV: 91.9 FL (ref 80–100)
MONOCYTES: 9.2 %
MPV: 11.2 FL (ref 7.5–12.5)
NEUTROPHILS: 62.4 %
NON-HDL CHOLESTEROL: 171 MG/DL (CALC)
PLATELET COUNT: 243 THOUSAND/UL (ref 140–400)
POTASSIUM: 4.5 MMOL/L (ref 3.5–5.3)
PROTEIN, TOTAL: 7.2 G/DL (ref 6.1–8.1)
RDW: 12 % (ref 11–15)
RED BLOOD CELL COUNT: 4.71 MILLION/UL (ref 4.2–5.8)
SODIUM: 139 MMOL/L (ref 135–146)
TRIGLYCERIDES: 227 MG/DL
TSH W/REFLEX TO FT4: 2.63 MIU/L (ref 0.4–4.5)
WHITE BLOOD CELL COUNT: 6.8 THOUSAND/UL (ref 3.8–10.8)

## 2025-05-29 PROCEDURE — 99213 OFFICE O/P EST LOW 20 MIN: CPT | Performed by: FAMILY MEDICINE

## 2025-05-29 RX ORDER — PHENAZOPYRIDINE HYDROCHLORIDE 100 MG/1
100 TABLET, FILM COATED ORAL 3 TIMES DAILY PRN
Qty: 20 TABLET | Refills: 0 | Status: SHIPPED | OUTPATIENT
Start: 2025-05-29 | End: 2025-06-08

## 2025-05-29 RX ORDER — LEVOFLOXACIN 500 MG/1
500 TABLET, FILM COATED ORAL DAILY
Qty: 10 TABLET | Refills: 0 | Status: SHIPPED | OUTPATIENT
Start: 2025-05-29 | End: 2025-06-08

## 2025-05-29 RX ORDER — LORAZEPAM 0.5 MG/1
0.5 TABLET ORAL EVERY 6 HOURS PRN
Qty: 90 TABLET | Refills: 2 | Status: SHIPPED | OUTPATIENT
Start: 2025-05-29

## 2025-05-29 NOTE — ASSESSMENT & PLAN NOTE
Orders:    LORazepam; Take 1 tablet (0.5 mg total) by mouth every 6 (six) hours as needed for Anxiety.  Dispense: 90 tablet; Refill: 2

## 2025-05-29 NOTE — PROGRESS NOTES
The following individual(s) verbally consented to be recorded using ambient AI listening technology and understand that they can each withdraw their consent to this listening technology at any point by asking the clinician to turn off or pause the recording:    Patient name: Archie KAREEN Fowler

## 2025-05-29 NOTE — PROGRESS NOTES
Subjective:   Archie is a 39 year old male coming in for had concerns including UTI (Having symptoms, having some burning and pitching discomfort ).   HPI  History of Present Illness  Archie Fowelr is a 39 year old male who presents with urinary symptoms suggestive of a urinary tract infection (UTI).    He experiences frequent urination and a constant sensation of needing to urinate, which worsens at night and when lying down. He describes a burning, pinching sensation during urination. Despite drinking 150 to 200 ounces of water daily and trying cranberry juice, there has been no improvement in symptoms.    A urinalysis conducted last week at the [facility] was negative for infection but showed glucose in the urine. Subsequent blood glucose testing was normal at 86 mg/dL. He takes magnesium and B12 complex daily, which he notes can cause his urine to appear yellow, potentially affecting urinalysis results.    He experienced a sinus infection starting on May 3, which resolved in about two weeks. During this period, he had poor sleep, which he believes may have affected his immune system. He reports a cycle of poor sleep and illness, with recent insomnia resulting in only four hours of sleep over two nights. He does not consume coffee but drinks about 12 ounces of Coke Zero daily. No current sinus symptoms are present.    No recent rectal exams and does not recall any past exams.     UTi in past. Frequency and dysuria    /76   Pulse 66   Resp 14   Ht 6' 1.5\" (1.867 m)   Wt 209 lb 12.8 oz (95.2 kg)   SpO2 98%   BMI 27.30 kg/m²  Body mass index is 27.3 kg/m².   Physical Exam  Constitutional:       Appearance: Normal appearance. He is well-developed.   HENT:      Head: Normocephalic and atraumatic.   Cardiovascular:      Rate and Rhythm: Normal rate and regular rhythm.   Pulmonary:      Effort: Pulmonary effort is normal. No respiratory distress.   Genitourinary:     Penis: Normal.        Prostate: Normal. Not enlarged, not tender and no nodules present.      Rectum: Normal.   Musculoskeletal:         General: Normal range of motion.      Cervical back: Normal range of motion and neck supple.   Skin:     Findings: No rash.   Neurological:      Mental Status: He is alert and oriented to person, place, and time.   Psychiatric:         Mood and Affect: Mood normal.         Behavior: Behavior normal.         Thought Content: Thought content normal.         Judgment: Judgment normal.        Physical Exam  ABDOMEN: Abdomen normal on palpation.  RECTAL: Prostate normal size, not inflamed. Rectal exam normal.        Results  LABS  Glucose: 86 mg/dL (05/29/2025)     Assessment & Plan  Dysuria    Orders:    levoFLOXacin; Take 1 tablet (500 mg total) by mouth daily for 10 days.  Dispense: 10 tablet; Refill: 0    UTI symptoms    Orders:    Urine Culture, Routine    levoFLOXacin; Take 1 tablet (500 mg total) by mouth daily for 10 days.  Dispense: 10 tablet; Refill: 0    Encounter for tobacco use cessation counseling    Orders:    Tobacco Cessation Discussion    Anxiety attack    Orders:    LORazepam; Take 1 tablet (0.5 mg total) by mouth every 6 (six) hours as needed for Anxiety.  Dispense: 90 tablet; Refill: 2    Persistent disorder of initiating or maintaining sleep    Orders:    Pulmonary Referral - In Network       Other orders    Phenazopyridine HCl; Take 1 tablet (100 mg total) by mouth 3 (three) times daily as needed for Pain.  Dispense: 20 tablet; Refill: 0     Assessment & Plan  Urinary tract symptoms  Frequent urination, urgency, and pinching sensation during urination. Negative urinalysis with glucose likely a lab error. Possible prostate involvement without inflammation or enlargement. Differential includes bladder infection or prostate issues. B12 supplement considered as irritant. Levaquin chosen for prostate penetration.  - Prescribed Levaquin for 5-10 days based on symptom resolution.  - Ordered  urine culture for bacterial growth and sensitivity.  - Prescribed Pyridium for symptomatic relief.  - Advised holding B12 supplement for 1-2 weeks.  - Encouraged increased intake of cranberry or black cherry products.    Insomnia  Recent poor sleep episodes may exacerbate urinary symptoms and affect health.    Follow-up  Monitoring and reassessment of symptoms planned.  - Scheduled follow-up in three weeks.  - Review urine culture results when available.  I have discontinued Archie CHRIS Fowler's traZODone. I have also changed his LORazepam. Additionally, I am having him start on levoFLOXacin and phenazopyridine. Lastly, I am having him maintain his Meloxicam and Meloxicam.       Return in about 4 weeks (around 6/26/2025) for Annual physical, as previously scheduled, Or sooner if symptoms persist.

## 2025-07-11 NOTE — TELEPHONE ENCOUNTER
[facility] called and cannot send an e script for this medication because it is new prescription for this pharmacy. Please call        [id number] - [place], [place] - [address] [facility] [phone], [phone]

## 2025-07-16 RX ORDER — MELOXICAM 15 MG/1
15 TABLET ORAL DAILY
Qty: 90 TABLET | Refills: 1 | Status: SHIPPED | OUTPATIENT
Start: 2025-07-16

## 2025-08-13 ENCOUNTER — OFFICE VISIT (OUTPATIENT)
Dept: FAMILY MEDICINE CLINIC | Facility: CLINIC | Age: 40
End: 2025-08-13

## 2025-08-13 VITALS
WEIGHT: 212.63 LBS | HEART RATE: 68 BPM | RESPIRATION RATE: 14 BRPM | HEIGHT: 73.5 IN | SYSTOLIC BLOOD PRESSURE: 118 MMHG | DIASTOLIC BLOOD PRESSURE: 82 MMHG | BODY MASS INDEX: 27.58 KG/M2 | OXYGEN SATURATION: 95 %

## 2025-08-13 DIAGNOSIS — R51.9 HEADACHE, UNSPECIFIED HEADACHE TYPE: ICD-10-CM

## 2025-08-13 DIAGNOSIS — Z71.6 ENCOUNTER FOR TOBACCO USE CESSATION COUNSELING: ICD-10-CM

## 2025-08-13 DIAGNOSIS — E78.1 HYPERTRIGLYCERIDEMIA: ICD-10-CM

## 2025-08-13 DIAGNOSIS — F51.01 PRIMARY INSOMNIA: ICD-10-CM

## 2025-08-13 DIAGNOSIS — Z00.00 ANNUAL PHYSICAL EXAM: Primary | ICD-10-CM

## 2025-08-13 PROCEDURE — 99396 PREV VISIT EST AGE 40-64: CPT | Performed by: FAMILY MEDICINE

## 2025-08-13 RX ORDER — SUMATRIPTAN SUCCINATE 100 MG/1
100 TABLET ORAL EVERY 2 HOUR PRN
Qty: 9 TABLET | Refills: 5 | Status: SHIPPED | OUTPATIENT
Start: 2025-08-13

## (undated) DIAGNOSIS — G44.209 MUSCLE TENSION HEADACHE: ICD-10-CM